# Patient Record
Sex: FEMALE | Race: WHITE | Employment: FULL TIME | URBAN - METROPOLITAN AREA
[De-identification: names, ages, dates, MRNs, and addresses within clinical notes are randomized per-mention and may not be internally consistent; named-entity substitution may affect disease eponyms.]

---

## 2017-05-30 ENCOUNTER — OFFICE VISIT (OUTPATIENT)
Dept: ORTHOPEDIC SURGERY | Age: 47
End: 2017-05-30

## 2017-05-30 VITALS
DIASTOLIC BLOOD PRESSURE: 81 MMHG | HEIGHT: 66 IN | BODY MASS INDEX: 23.14 KG/M2 | HEART RATE: 60 BPM | WEIGHT: 143.96 LBS | SYSTOLIC BLOOD PRESSURE: 137 MMHG

## 2017-05-30 DIAGNOSIS — M79.672 BILATERAL FOOT PAIN: Primary | ICD-10-CM

## 2017-05-30 DIAGNOSIS — M20.21 HALLUX RIGIDUS OF BOTH FEET: ICD-10-CM

## 2017-05-30 DIAGNOSIS — M20.22 HALLUX RIGIDUS OF BOTH FEET: ICD-10-CM

## 2017-05-30 DIAGNOSIS — M79.671 BILATERAL FOOT PAIN: Primary | ICD-10-CM

## 2017-05-30 PROCEDURE — 99213 OFFICE O/P EST LOW 20 MIN: CPT | Performed by: ORTHOPAEDIC SURGERY

## 2017-05-30 PROCEDURE — 73630 X-RAY EXAM OF FOOT: CPT | Performed by: ORTHOPAEDIC SURGERY

## 2018-02-27 ENCOUNTER — OFFICE VISIT (OUTPATIENT)
Dept: DERMATOLOGY | Age: 48
End: 2018-02-27

## 2018-02-27 DIAGNOSIS — D22.9 MULTIPLE NEVI: Primary | ICD-10-CM

## 2018-02-27 DIAGNOSIS — K13.0 CHEILITIS: ICD-10-CM

## 2018-02-27 DIAGNOSIS — D48.5 NEOPLASM OF UNCERTAIN BEHAVIOR OF SKIN: ICD-10-CM

## 2018-02-27 DIAGNOSIS — Z87.2 HISTORY OF ACTINIC KERATOSES: ICD-10-CM

## 2018-02-27 DIAGNOSIS — Z80.8 FAMILY HISTORY OF MALIGNANT MELANOMA: ICD-10-CM

## 2018-02-27 PROCEDURE — 99214 OFFICE O/P EST MOD 30 MIN: CPT | Performed by: DERMATOLOGY

## 2018-02-27 PROCEDURE — 11100 PR BIOPSY OF SKIN LESION: CPT | Performed by: DERMATOLOGY

## 2018-02-27 NOTE — PROGRESS NOTES
abnormal: RUE, Lip  trunk and extremities with scattered brown macules and papules   L FA with shiny bright pink macule  R upper lip with rough ill-defined scaly skin on the dry mucosa    Assessment and Plan     1. Benign-appearing nevi   2. History of AK's, none today  3. Family history of melanoma  - educ re ABCD's of MM   educ sun protection   encouraged skin check yearly (sooner if indicated), self checks    4. R FA - r/o BCC  - Shave biopsy performed after verbal consent obtained. Patient educated regarding risk of bleeding, infection, scar and educated on wound care. Skin cleansed with alcohol pad and site anesthetized with lido + epi. Aluminum chloride applied to site for hemostasis. Petrolatum ointment and bandage applied. Specimen bottle labeled with patient information and site and specimen sent to dermpath.       5. Cheilitis - concerning for AK/actinic damage  -  aquaphor or HC 1% - 2.5 ointment daily - bid and if no improvement in the next 2 mos, then consider efudex or LN2

## 2018-03-07 ENCOUNTER — TELEPHONE (OUTPATIENT)
Dept: DERMATOLOGY | Age: 48
End: 2018-03-07

## 2018-03-13 ENCOUNTER — PROCEDURE VISIT (OUTPATIENT)
Dept: DERMATOLOGY | Age: 48
End: 2018-03-13

## 2018-03-13 VITALS — SYSTOLIC BLOOD PRESSURE: 102 MMHG | DIASTOLIC BLOOD PRESSURE: 66 MMHG

## 2018-03-13 DIAGNOSIS — C44.612 BASAL CELL CARCINOMA OF RIGHT FOREARM: Primary | ICD-10-CM

## 2018-03-13 DIAGNOSIS — D48.5 NEOPLASM OF UNCERTAIN BEHAVIOR OF SKIN: ICD-10-CM

## 2018-03-13 PROCEDURE — 12032 INTMD RPR S/A/T/EXT 2.6-7.5: CPT | Performed by: DERMATOLOGY

## 2018-03-13 PROCEDURE — 11100 PR BIOPSY OF SKIN LESION: CPT | Performed by: DERMATOLOGY

## 2018-03-13 PROCEDURE — 11602 EXC TR-EXT MAL+MARG 1.1-2 CM: CPT | Performed by: DERMATOLOGY

## 2018-03-13 NOTE — PATIENT INSTRUCTIONS
Surgical Wound Care Instructions    Sutured Wounds:   After your surgery, go home and take it easy. Please refrain from any vigorous physical activity or heavy lifting.  Leave the pressure bandage in place for ~48 hours. If it starts to detach, reinforce the bandage with another piece of tape.  Please keep the bandage dry for ~48 hours.  After ~48 hours, the wound can get wet. Clean the area daily using mild soapy water and a gauze pad or cotton tipped applicator, applying gentle pressure.  DO NOT Apply aquaphor, Vaseline or petroleum jelly. DO NOT APPLY lotion, ointment, cream or oil to areas with surgical glue.  Cut a Telfa pad in the shape of the wound but slightly larger and secure with tape. Special Sites:   Facial sites:  o Keep the wound elevated for the first 2 nights.  o Do not sleep on the side of the body where the wound is located. o Do not bend your head lower than your heart or engage in heavy lifting.  Leg:  o Keep the leg elevated when reclining, using a pillow to elevate the extremity. Complications:   If bleeding develops when you go home, apply pressure for 15 minutes continuously. A small amount of ice in a bag covered with a towel may be used for another 10 minutes if necessary. If the bleeding does not stop, please call your dermatologist.   Please call the office if you develop any fevers, chills or pus drains from the wound.  A small amount of redness at the site of the surgery is normal at first, but if the redness begins to spread and/or pain worsens, you may have an infection and need to call the office.

## 2018-03-26 ENCOUNTER — TELEPHONE (OUTPATIENT)
Dept: DERMATOLOGY | Age: 48
End: 2018-03-26

## 2018-06-21 ENCOUNTER — HOSPITAL ENCOUNTER (OUTPATIENT)
Dept: ENDOSCOPY | Age: 48
Discharge: HOME | End: 2018-06-21
Attending: INTERNAL MEDICINE | Admitting: INTERNAL MEDICINE

## 2018-06-21 VITALS
HEIGHT: 66 IN | HEART RATE: 70 BPM | TEMPERATURE: 98.3 F | RESPIRATION RATE: 18 BRPM | DIASTOLIC BLOOD PRESSURE: 57 MMHG | WEIGHT: 135 LBS | BODY MASS INDEX: 21.69 KG/M2 | SYSTOLIC BLOOD PRESSURE: 137 MMHG | OXYGEN SATURATION: 99 %

## 2018-06-21 RX ORDER — OMEPRAZOLE 40 MG/1
40 CAPSULE, DELAYED RELEASE ORAL DAILY
Qty: 30 CAPSULE | Refills: 1 | Status: SHIPPED | OUTPATIENT
Start: 2018-06-21

## 2018-06-25 ENCOUNTER — TELEPHONE (OUTPATIENT)
Dept: DERMATOLOGY | Age: 48
End: 2018-06-25

## 2018-11-20 ENCOUNTER — OFFICE VISIT (OUTPATIENT)
Dept: ORTHOPEDIC SURGERY | Age: 48
End: 2018-11-20
Payer: COMMERCIAL

## 2018-11-20 VITALS — BODY MASS INDEX: 21.68 KG/M2 | HEIGHT: 66 IN | WEIGHT: 134.92 LBS

## 2018-11-20 DIAGNOSIS — M20.21 HALLUX RIGIDUS OF BOTH FEET: Primary | ICD-10-CM

## 2018-11-20 DIAGNOSIS — M20.22 HALLUX RIGIDUS OF BOTH FEET: Primary | ICD-10-CM

## 2018-11-20 PROCEDURE — 99212 OFFICE O/P EST SF 10 MIN: CPT | Performed by: ORTHOPAEDIC SURGERY

## 2019-02-11 ENCOUNTER — OFFICE VISIT (OUTPATIENT)
Dept: DERMATOLOGY | Age: 49
End: 2019-02-11
Payer: COMMERCIAL

## 2019-02-11 DIAGNOSIS — D22.9 MULTIPLE NEVI: Primary | ICD-10-CM

## 2019-02-11 DIAGNOSIS — Z87.2 HISTORY OF ACTINIC KERATOSES: ICD-10-CM

## 2019-02-11 DIAGNOSIS — Z80.8 FAMILY HISTORY OF MALIGNANT MELANOMA: ICD-10-CM

## 2019-02-11 DIAGNOSIS — Z85.828 HISTORY OF NONMELANOMA SKIN CANCER: ICD-10-CM

## 2019-02-11 PROCEDURE — 99213 OFFICE O/P EST LOW 20 MIN: CPT | Performed by: DERMATOLOGY

## 2019-02-11 RX ORDER — VALACYCLOVIR HYDROCHLORIDE 1 G/1
TABLET, FILM COATED ORAL
Qty: 16 TABLET | Refills: 2 | Status: SHIPPED | OUTPATIENT
Start: 2019-02-11 | End: 2020-08-31 | Stop reason: SDUPTHER

## 2019-09-30 ENCOUNTER — OFFICE VISIT (OUTPATIENT)
Dept: DERMATOLOGY | Age: 49
End: 2019-09-30
Payer: COMMERCIAL

## 2019-09-30 DIAGNOSIS — D48.5 NEOPLASM OF UNCERTAIN BEHAVIOR OF SKIN: ICD-10-CM

## 2019-09-30 DIAGNOSIS — D22.9 MULTIPLE NEVI: Primary | ICD-10-CM

## 2019-09-30 DIAGNOSIS — L57.0 AK (ACTINIC KERATOSIS): ICD-10-CM

## 2019-09-30 DIAGNOSIS — Z85.828 HISTORY OF NONMELANOMA SKIN CANCER: ICD-10-CM

## 2019-09-30 DIAGNOSIS — Z80.8 FAMILY HISTORY OF MALIGNANT MELANOMA: ICD-10-CM

## 2019-09-30 PROCEDURE — 11102 TANGNTL BX SKIN SINGLE LES: CPT | Performed by: DERMATOLOGY

## 2019-09-30 PROCEDURE — 99213 OFFICE O/P EST LOW 20 MIN: CPT | Performed by: DERMATOLOGY

## 2019-09-30 PROCEDURE — 17000 DESTRUCT PREMALG LESION: CPT | Performed by: DERMATOLOGY

## 2019-09-30 NOTE — PROGRESS NOTES
[id number]  Enrico Downing  1970    51 y.o. female     Date of Visit: 9/30/2019    Last seen 2-2019  *dad diagnosed with lung cancer in 2018    Chief Complaint: moles, f/u AK's   Chief Complaint   Patient presents with    Skin Exam     spot on nose     History of Present Illness:    1. Here for skin check for eval of multiple asx moles on the trunk and extremities, many present since child/young adult; no change in s/s/c of any lesions; no bleeding lesions; all asx   2. She has a hx of AK's - 1 new lesion on the L upper arm. Asx.  3. She has a family hx of melanoma. 4. Hx of NMSC - BCC - R FA - excision 3-2018. No probs with this site and no new concerns noted. Here for FSE. 5. She notes a small dry spot on the nose x 1 year but not healing for the past 2 weeks and has been bleeding. Asx. Hx of AK vs cheilitis on the upper lip - remains clear; rec aquaphor/HC at past visit. Family history of both BCC (father and sister) and MM (mother). Review of Systems:  Gen: Feels well, good sense of health. Skin: No new or changing moles. No new rashes. Past Medical History, Family History, Surgical History, Medications and Allergies reviewed. Social History:  Has 3 sons - they go to [address] [place] [address]. Chantelle Knows Dr. George Knapp and [facility]. Past Medical History:   Diagnosis Date    Arthritis     TOES GREAT    Cancer ([facility] [address]     basal cell cancer       Past Surgical History:   Procedure Laterality Date    CARPAL TUNNEL RELEASE Bilateral 11/2016    SKIN CANCER EXCISION      TONSILLECTOMY         Outpatient Medications Marked as Taking for the 9/30/19 encounter (Office Visit) with Mallorie Duncan MD   Medication Sig Dispense Refill    valACYclovir (VALTREX) 1 g tablet Take 2 po at first signs of flare and then 2 po 12 hours later.  16 tablet 2    omeprazole (PRILOSEC) 40 MG delayed release capsule Take 1 capsule by mouth daily 30 capsule 1   

## 2019-10-03 LAB — DERMATOLOGY PATHOLOGY REPORT: ABNORMAL

## 2019-10-09 DIAGNOSIS — C44.311 BASAL CELL CARCINOMA (BCC) OF SKIN OF NOSE: Primary | ICD-10-CM

## 2019-11-07 ENCOUNTER — PROCEDURE VISIT (OUTPATIENT)
Dept: SURGERY | Age: 49
End: 2019-11-07
Payer: COMMERCIAL

## 2019-11-07 VITALS
BODY MASS INDEX: 23.41 KG/M2 | TEMPERATURE: 98 F | WEIGHT: 145 LBS | OXYGEN SATURATION: 100 % | HEART RATE: 58 BPM | SYSTOLIC BLOOD PRESSURE: 132 MMHG | DIASTOLIC BLOOD PRESSURE: 73 MMHG

## 2019-11-07 DIAGNOSIS — D48.5 NEOPLASM OF UNCERTAIN BEHAVIOR OF SKIN: ICD-10-CM

## 2019-11-07 DIAGNOSIS — C44.311 BASAL CELL CARCINOMA (BCC) OF RIGHT NASAL SIDEWALL: Primary | ICD-10-CM

## 2019-11-07 PROCEDURE — 17312 MOHS ADDL STAGE: CPT | Performed by: DERMATOLOGY

## 2019-11-07 PROCEDURE — 17311 MOHS 1 STAGE H/N/HF/G: CPT | Performed by: DERMATOLOGY

## 2019-11-07 PROCEDURE — 12051 INTMD RPR FACE/MM 2.5 CM/<: CPT | Performed by: DERMATOLOGY

## 2019-11-07 PROCEDURE — 11102 TANGNTL BX SKIN SINGLE LES: CPT | Performed by: DERMATOLOGY

## 2019-11-08 ENCOUNTER — TELEPHONE (OUTPATIENT)
Dept: SURGERY | Age: 49
End: 2019-11-08

## 2019-11-12 ENCOUNTER — TELEPHONE (OUTPATIENT)
Dept: SURGERY | Age: 49
End: 2019-11-12

## 2019-11-14 ENCOUNTER — TELEPHONE (OUTPATIENT)
Dept: SURGERY | Age: 49
End: 2019-11-14

## 2020-01-09 ENCOUNTER — PROCEDURE VISIT (OUTPATIENT)
Dept: SURGERY | Age: 50
End: 2020-01-09
Payer: COMMERCIAL

## 2020-01-09 VITALS
SYSTOLIC BLOOD PRESSURE: 113 MMHG | OXYGEN SATURATION: 100 % | HEART RATE: 59 BPM | BODY MASS INDEX: 22.12 KG/M2 | DIASTOLIC BLOOD PRESSURE: 75 MMHG | TEMPERATURE: 98.1 F | WEIGHT: 137 LBS

## 2020-01-09 PROCEDURE — 17312 MOHS ADDL STAGE: CPT | Performed by: DERMATOLOGY

## 2020-01-09 PROCEDURE — 17311 MOHS 1 STAGE H/N/HF/G: CPT | Performed by: DERMATOLOGY

## 2020-01-09 PROCEDURE — 12052 INTMD RPR FACE/MM 2.6-5.0 CM: CPT | Performed by: DERMATOLOGY

## 2020-01-09 PROCEDURE — 11102 TANGNTL BX SKIN SINGLE LES: CPT | Performed by: DERMATOLOGY

## 2020-01-09 NOTE — PATIENT INSTRUCTIONS
[facility] Office Hours:    Monday-Thursday  7:30 AM-4:30 PM    Friday  9:00 AM-3:00 PM    POST-OPERATIVE CARE FOR LIQUID SKIN ADHESIVE             Bandage change after 24 hours    During your procedure today, a liquid skin adhesive was used to close the wound. You do not have to have stiches removed. If has a clear to light purple shiny surface. You do not have to have this removed. It will dissolve (melt away) in about 1-2 weeks. Please follow these instructions to help you recover from your procedure and help you wound heal.    CARING FOR YOUR SURGICAL SITE  The bandage should remain on and completley dry for 24 hours. Do NOT get the bandage wet. 1. After the first 24 hours, gently remove the remaining part of the bandage. It can be helpful to moisten the bandage edges in the shower. 2. Be gentle around the area of the wound. Do not scrub, rub or pick at the skin glue. It will gradually dissolve in 1-2 weeks. 3. Do not shave directly over the wound for one week. You can shave around the area. 4. After one week you can start cleaning the area gently and resume all normal activity. No further restrictions. 5. Use Sunscreen with SPF of at least 30 on the area around the wound. If the dressing comes off or if you have questions, or concerns about the dressing, please call the office for instructions! POST OPERATIVE INSTRUCTIONS    1. Activity: Do not lift anything heavier than a gallon of milk for 1 week. Also, avoid strenuous activity such as running, power walking or contact sports. 2. Eating and drinking: Do not drink alcohol for 48 hours after your procedure. Alcohol increases the chances of bleeding. 3. Medicines   -If you have discomfort, take Acetaminophen (Tylenol or Extra Strength Tylenol). Follow the instructions and warning on the bottle. -If your doctor has prescribed you an Aspirin daily, please keep taking it.  Do not take extra Aspirin or medicines containing

## 2020-01-09 NOTE — PROGRESS NOTES
skin, using the technique of Mohs. A map was prepared to correspond to the area of skin from which it was excised. Hemostasis was achieved using electrosurgery. The wound was bandaged. The tissue was prepared for the cryostat and sectioned. 1 section(s) prepared. Each section was coded, cut, and stained for microscopic examination. The entire base and margins of the excised piece of tissue were examined by the surgeon. Stage I, II:  Superficial BCC: isolated basaloid lobules projecting from the lower margin of the epidermis. The remaining tumor was noted and the next stage was performed. Stage II, III:  A thin layer of tissue was removed at the histologically-identified sites of remaining tumor. The entire procedure as described in stage I was repeated to process the tissue according to Mohs technique. I section(s) prepared for stage II and III. No tumor was identified at the peripheral margins of stage III of microscopically controlled surgery. DEFECT MANAGEMENT:    REPAIR DESCRIPTION:  Various closure modalities were discussed with the patient, and it was decided that an intermediate layered repair would best preserve normal anatomic and functional relationships. Additional risk of wound dehiscence was discussed. The area was anesthetized with 1% lidocaine with epinephrine 1:100,000 buffered, was given a sterile prep using Betadine and draped in the usual sterile fashion. Recreation and enlargement of the wound was performed by excising cones of tissue via the triangulation technique. The final incision lines were placed with respect for the patient's natural skin tension lines in a linear configuration to avoid functional and aesthetic distortion of adjacent free margins. Following minimal undermining, meticulous hemostasis was obtained with spot mother.   Subcutaneous dead space and dermis were closed using 5-0 Vicryl buried subcutaneous interrupted suture and the epidermis was approximated with 5-0 Fast absorbing gut running epidermal sutures . WOUND COVERAGE:  The wound was cleaned with normal saline solution, dried off, liquid skin adhesive was applied, and the wound was covered. A dressing was applied for stabilization and light pressure. The patient was given detailed oral and written instructions on postoperative care. There were no complications. The patient left the Unit in good medical condition. FOLLOW-UP:  As dissolving sutures were placed, the patient was asked to return if any questions or concerns arose, but otherwise will return to see general dermatology per their instructions.

## 2020-01-10 ENCOUNTER — TELEPHONE (OUTPATIENT)
Dept: SURGERY | Age: 50
End: 2020-01-10

## 2020-01-15 ENCOUNTER — TELEPHONE (OUTPATIENT)
Dept: SURGERY | Age: 50
End: 2020-01-15

## 2020-02-11 ENCOUNTER — OFFICE VISIT (OUTPATIENT)
Dept: DERMATOLOGY | Age: 50
End: 2020-02-11
Payer: COMMERCIAL

## 2020-02-11 PROCEDURE — 17003 DESTRUCT PREMALG LES 2-14: CPT | Performed by: DERMATOLOGY

## 2020-02-11 PROCEDURE — 17000 DESTRUCT PREMALG LESION: CPT | Performed by: DERMATOLOGY

## 2020-02-11 PROCEDURE — 99213 OFFICE O/P EST LOW 20 MIN: CPT | Performed by: DERMATOLOGY

## 2020-02-11 NOTE — PROGRESS NOTES
[facility]  Mohsen Jackson MD  [phone]      Amanda Rocha  1970    47 y.o. female     Date of Visit: 2/11/2020    Last seen 9-2019  *dad diagnosed with lung cancer in 2018    Chief Complaint: moles, f/u AK's   Chief Complaint   Patient presents with    Skin Lesion     FSE     - mole check      HX: multi nevi     History of Present Illness:    1. Here for skin check for eval of multiple asx moles on the trunk and extremities, many present since child/young adult; no change in s/s/c of any lesions; no bleeding lesions; all asx   2. She has a hx of AK's - few new on the face;  Asx.   3. She has a family hx of melanoma. 4. Hx of NMSC - sp Mohs for 2 new BCC\"s on the nose since last seen with erythematous healing scars:  Nod BCC on the R nasal sidewall - s/p Mohs  with Dr. Vlad Macdonald on the R nasal bridge - s/p Mohs 1-2020  BCC - R FA - excision 3-2018. No probs with this sites and no new concerns noted. Here for FSE. Hx of AK vs cheilitis on the upper lip - remains clear; rec aquaphor/HC at past visit. Family history of both BCC (father and sister) and MM (mother). Review of Systems:  Gen: Feels well, good sense of health. Skin: No new or changing moles. No new rashes. Past Medical History, Family History, Surgical History, Medications and Allergies reviewed. Social History:  Has 3 sons - they go to [place]. Chantelle Hernandez and Clarice Hooks.     Past Medical History:   Diagnosis Date    Arthritis     TOES GREAT    Cancer ([address]     basal cell cancer       Past Surgical History:   Procedure Laterality Date    CARPAL TUNNEL RELEASE Bilateral 11/2016    MOHS SURGERY  11/07/2019    MOHS SURGERY Right 01/09/2020    BCC superficial right nasal bridge    SKIN CANCER EXCISION      TONSILLECTOMY         Outpatient Medications Marked as Taking for the 2/11/20 encounter (Office Visit) with Marvin Hubbard MD   Medication Sig Dispense Refill   [place] valACYclovir (VALTREX) 1 g tablet Take 2 po at first signs of flare and then 2 po 12 hours later. 16 tablet 2    omeprazole (PRILOSEC) 40 MG delayed release capsule Take 1 capsule by mouth daily 30 capsule 1    Omega-3 Fatty Acids (FISH OIL) 1000 MG CAPS Take 3,000 mg by mouth daily      TURMERIC CURCUMIN PO Take by mouth daily      Multiple Vitamin (MULTIVITAMINS PO) Take  by mouth.  vitamin D (CHOLECALCIFEROL) 1000 UNIT TABS tablet Take 2,000 Units by mouth daily          Allergies   Allergen Reactions    No Known Allergies          Physical Examination     Well-appearing  The following were examined and determined to be normal: Psych/Neuro, Scalp/hair, Conjunctivae/eyelids,, Neck, Breast/axilla/chest, Abdomen, Back, RLE, LLE, Nails/digits, Oral and buttocks. RUE, Lip  The following were examined and determined to be abnormal: face, LUE    trunk and extremities with scattered brown macules and papules   R FA with linear faintly pink scar  R nasal sidewall with linear mildly erythematous and telangiectatic scar  R nasal bridge with firm erythemaotus telangiectatic scar  L cheek and L FH with erythematous roughly scaled macules            Assessment and Plan     1. Benign-appearing nevi   - educ re ABCD's of MM   educ sun protection   encouraged skin check yearly (sooner if indicated), self checks     2. History of AK's, 2 new today on the L cheek and L FH  - 2 lesion(s) on the L arm treated with liquid nitrogen x 2 cycles. Patient educated on risk of blister, hypopigmentation/scar and wound care. Consider PDT if needed in the fall/winter. 3.  Family history of melanoma  4.  Hx of BCC, no signs recurrence  - educ re ABCD's of MM   educ sun protection   encouraged skin check yearly (sooner if indicated), self checks    Telangiectatic erythematous scars  - R nasal sidewall/dorsum and R nasal bridge  *Vbeam today for scars - see below; no extrac charge    Laser Procedure Note       Kamilah Night   DATE

## 2020-08-31 ENCOUNTER — OFFICE VISIT (OUTPATIENT)
Dept: DERMATOLOGY | Age: 50
End: 2020-08-31
Payer: COMMERCIAL

## 2020-08-31 VITALS — TEMPERATURE: 98.8 F

## 2020-08-31 PROCEDURE — 11103 TANGNTL BX SKIN EA SEP/ADDL: CPT | Performed by: DERMATOLOGY

## 2020-08-31 PROCEDURE — 99213 OFFICE O/P EST LOW 20 MIN: CPT | Performed by: DERMATOLOGY

## 2020-08-31 PROCEDURE — 17000 DESTRUCT PREMALG LESION: CPT | Performed by: DERMATOLOGY

## 2020-08-31 PROCEDURE — 11102 TANGNTL BX SKIN SINGLE LES: CPT | Performed by: DERMATOLOGY

## 2020-08-31 RX ORDER — VALACYCLOVIR HYDROCHLORIDE 1 G/1
TABLET, FILM COATED ORAL
Qty: 16 TABLET | Refills: 2 | Status: SHIPPED | OUTPATIENT
Start: 2020-08-31 | End: 2020-11-29 | Stop reason: SDUPTHER

## 2020-08-31 NOTE — PROGRESS NOTES
[facility]  Meriel Shone, MD  [phone]      Shilpa Patino  1970    47 y.o. female     Date of Visit: 8/31/2020    Last seen 2-2020  *dad diagnosed with lung cancer in 2018    Chief Complaint: moles, f/u AK's   Chief Complaint   Patient presents with    Skin Exam     History of Present Illness:    1. Here for skin check for eval of multiple asx moles on the trunk and extremities, many present since child/young adult; no change in s/s/c of any lesions; no bleeding lesions; all asx     2. She has a hx of AK's - 1 new on the FH;  Asx.     3. She has a family hx of melanoma. 4. Hx of NMSC - several in recent years. Nod BCC on the R nasal sidewall - s/p Mohs  with Dr. Katie Urbina  Sup [address] on the R nasal bridge - s/p Mohs 1-2020  BCC - R FA - excision 3-2018. No probs with this sites but new concerns noted below. Here for FSE. 5. She has a few noted concerning lesions:  R outer shoulder - pigmented lesion  R upper outer arm - pink spot  L anterior ala - small pink persistent bump  All asx. Hx of AK vs cheilitis on the upper lip - remains clear; rec aquaphor/HC at past visit. Family history of both BCC (father and sister) and MM (mother). Review of Systems:  Gen: Feels well, good sense of health. Skin: No new or changing moles. No new rashes. Past Medical History, Family History, Surgical History, Medications and Allergies reviewed. Social History:  Has 3 sons - they go to [address] [place] [address]. Chantelle Fernandez and Jennifer Christensen.     Past Medical History:   Diagnosis Date    Arthritis     TOES GREAT    Cancer ([address]     basal cell cancer       Past Surgical History:   Procedure Laterality Date    CARPAL TUNNEL RELEASE Bilateral 11/2016    MOHS SURGERY  11/07/2019    MOHS SURGERY Right 01/09/2020    BCC superficial right nasal bridge    SKIN CANCER EXCISION      TONSILLECTOMY         Outpatient Medications Marked as Taking for the 8/31/20 encounter (Office Visit) with Maria Isabel Archer MD   Medication Sig Dispense Refill    valACYclovir (VALTREX) 1 g tablet Take 2 po at first signs of flare and then 2 po 12 hours later. 16 tablet 2    omeprazole (PRILOSEC) 40 MG delayed release capsule Take 1 capsule by mouth daily 30 capsule 1    Omega-3 Fatty Acids (FISH OIL) 1000 MG CAPS Take 3,000 mg by mouth daily      TURMERIC CURCUMIN PO Take by mouth daily      Multiple Vitamin (MULTIVITAMINS PO) Take  by mouth.  vitamin D (CHOLECALCIFEROL) 1000 UNIT TABS tablet Take 2,000 Units by mouth daily          Allergies   Allergen Reactions    No Known Allergies          Physical Examination     Well-appearing  The following were examined and determined to be normal: Psych/Neuro, Scalp/hair, Conjunctivae/eyelids,, Neck, Breast/axilla/chest, Abdomen, Back, RLE, LLE, Nails/digits, Oral and buttocks. RUE, Lip  The following were examined and determined to be abnormal: face, LUE    trunk and extremities with scattered brown macules and papules   R FA with linear faintly pink scar  R nasal sidewall with linear mildly erythematous and telangiectatic scar  R nasal bridge with firm erythemaotus telangiectatic scar  FH with erythematous roughly scaled macule     R outer shoulder - irregular brown macule/thin papule  R upper outer arm - pink eroded macule  L anterior ala - 2 mm pink papule                    Assessment and Plan     1. Benign-appearing nevi   - educ re ABCD's of MM   educ sun protection   encouraged skin check yearly (sooner if indicated), self checks     2. History of AK's, 1 new today on the FH  - 1 lesion(s) on the L arm treated with liquid nitrogen x 2 cycles. Patient educated on risk of blister, hypopigmentation/scar and wound care. Consider PDT if needed in the fall/winter. 3.  Family history of melanoma  4. Hx of BCC, no signs recurrence  - educ re ABCD's of MM   educ sun protection   encouraged skin check yearly (sooner if indicated), self checks    5.  R outer shoulder - r/o dysplastic nevus - site A  R upper outer arm - r/o BCC - site B  L anterior ala - r/o BCC  - 3 Shave biopsy performed after verbal consent obtained. Patient educated regarding risk of bleeding, infection, scar and educated on wound care. Skin cleansed with alcohol pad and site anesthetized with lido + epi. Aluminum chloride applied to site for hemostasis. Petrolatum ointment and bandage applied. Specimen bottle labeled with patient information and site and specimen sent to dermpath. Consider further Vbeam  Telangiectatic erythematous scars  - R nasal sidewall/dorsum and R nasal bridge    F/u in 4-6 mos.

## 2020-09-02 LAB — DERMATOLOGY PATHOLOGY REPORT: ABNORMAL

## 2020-09-09 ENCOUNTER — TELEPHONE (OUTPATIENT)
Dept: DERMATOLOGY | Age: 50
End: 2020-09-09

## 2020-09-11 ENCOUNTER — TELEPHONE (OUTPATIENT)
Dept: DERMATOLOGY | Age: 50
End: 2020-09-11

## 2020-09-15 ENCOUNTER — TELEPHONE (OUTPATIENT)
Dept: DERMATOLOGY | Age: 50
End: 2020-09-15

## 2020-09-15 NOTE — TELEPHONE ENCOUNTER
Patient is requesting a return call from Dr Loretta Vanegas to discuss alternate treatments for third basal cell on nose. What will be the next best step. Please advise. Thank you!

## 2020-10-12 ENCOUNTER — PROCEDURE VISIT (OUTPATIENT)
Dept: DERMATOLOGY | Age: 50
End: 2020-10-12
Payer: COMMERCIAL

## 2020-10-12 VITALS — TEMPERATURE: 98.2 F

## 2020-10-12 PROCEDURE — 99213 OFFICE O/P EST LOW 20 MIN: CPT | Performed by: DERMATOLOGY

## 2020-10-12 PROCEDURE — 11401 EXC TR-EXT B9+MARG 0.6-1 CM: CPT | Performed by: DERMATOLOGY

## 2020-10-12 RX ORDER — FLUOROURACIL 50 MG/G
CREAM TOPICAL
Qty: 40 G | Refills: 3 | Status: SHIPPED | OUTPATIENT
Start: 2020-10-12 | End: 2020-11-19 | Stop reason: SDUPTHER

## 2020-10-12 NOTE — PATIENT INSTRUCTIONS
Wound Care Instructions    · Keep the bandage in place for 24 hours. · Cleanse the wound with mild soapy water daily   Gently dry the area.  Apply Vaseline or petroleum jelly to the wound using a cotton tipped applicator.  Cover with a clean bandage.  Repeat this process until the biopsy site is healed.  If you had stitches placed, continue treating the site until the stitches are removed. Remember to make an appointment to return to have your stitches removed by our staff.  You may shower and bathe as usual.     *SUTURES should stay in~ 14 days    ** results generally take around 7 business days to come back. If you have not heard from us by then, please call the office at [phone] between 8AM and 4PM Monday through Friday.

## 2020-10-12 NOTE — PROGRESS NOTES
[facility]  Natasha Lane MD  [phone]      Ana Loser  1970    47 y.o. female     Date of Visit: 10/12/2020    Last seen 8-2020  *dad diagnosed with lung cancer in 2018    Chief Complaint: dysplastic nevus, hx of NMSC  Chief Complaint   Patient presents with    Nevus     dysplastic on right shoulder     History of Present Illness:    1. Here for excision fo moderately dysplastic nevus on the R shoulder, bx'd 8-2020 and no probs since. Extended to lateral and deep margin on the bx.    2. BCC on the nose - bx'd 8-2020 - has Mohs appt with Dr. Pamella Barboza next month. No probs since. 3. She has a hx of AK's - would like to consider more \"preventative\" trx for the face, chest, arns, nellie with hx of multiple BCC's in the past 2 years. She has a family hx of melanoma. Nod BCC on the R nasal sidewall - s/p Mohs  with Dr. Reynaldo Emmanuel  Sup [address] on the R nasal bridge - s/p Mohs 1-2020  BCC - R FA - excision 3-2018. Hx of AK vs cheilitis on the upper lip - remains clear; rec aquaphor/HC at past visit. Family history of both BCC (father and sister) and MM (mother). Review of Systems:  Gen: Feels well, good sense of health. Skin: No new or changing moles. No new rashes. Past Medical History, Family History, Surgical History, Medications and Allergies reviewed. Social History:  Has 3 sons - they go to [facility] [place]. Chantelle Whitney and Shannen Montelongo.     Past Medical History:   Diagnosis Date    Arthritis     TOES GREAT    Cancer ([facility] [address]     basal cell cancer       Past Surgical History:   Procedure Laterality Date    CARPAL TUNNEL RELEASE Bilateral 11/2016    MOHS SURGERY  11/07/2019    MOHS SURGERY Right 01/09/2020    BCC superficial right nasal bridge    SKIN CANCER EXCISION      TONSILLECTOMY         Outpatient Medications Marked as Taking for the 10/12/20 encounter (Procedure visit) with Loralie Seip, MD   Medication Sig Dispense Refill    valACYclovir (VALTREX) 1 g tablet Take 2 po at first signs of flare and then 2 po 12 hours later. 16 tablet 2    omeprazole (PRILOSEC) 40 MG delayed release capsule Take 1 capsule by mouth daily 30 capsule 1    Omega-3 Fatty Acids (FISH OIL) 1000 MG CAPS Take 3,000 mg by mouth daily      TURMERIC CURCUMIN PO Take by mouth daily      Multiple Vitamin (MULTIVITAMINS PO) Take  by mouth.  vitamin D (CHOLECALCIFEROL) 1000 UNIT TABS tablet Take 2,000 Units by mouth daily          Allergies   Allergen Reactions    No Known Allergies          Physical Examination     Well-appearing    The following were examined and determined to be normal: Psych/Neuro, Head/face, Conjunctivae/eyelids, Gums/teeth/lips, Neck, Breast/axilla/chest and LUE. The following were examined and determined to be abnormal: RUE. R outer shoulder -pink smooth scar  L anterior ala - clear                    Assessment and Plan     1. R outer shoulder - site A -mod dysplastic and not excised - extending to the lateral margin and base  - excision today after risks reviewed, options discussed  educ risk bleed, infxn, scar   area anesth with lido with epi and prepped in sterile manner   Lesion excised to superficial SQ with 8 mm punch tool with margins   specimen to path    wound closed with simple 4-0 running nylon suture   pressure dressing applied   pt educ re wnd care and suture removal      2. L anterior ala - BCC - Mohs with Dr. Marion Corea next month    3.  Hx of AK's and NMSC  - discussed sun protection, PDT, efudex, aldara, tretinoin  - cont sun protection  - plan for PDT for the face (can avoid immediate perioral area but still take valtrex prophylactically) - incubate 1 hour + 15 min  - can try efudex for the chest, arms  - discussed typical response/goals, sun avoidance with above  - add tretinoin 0.025% cr qhs to face; educ irrit and photosens  - can add nicotinamide 500 mg po bid     Consider further Vbeam for scar  Telangiectatic erythematous scars  - R nasal sidewall/dorsum and R nasal bridge    F/u in 4-6 mos.

## 2020-10-13 ENCOUNTER — TELEPHONE (OUTPATIENT)
Dept: DERMATOLOGY | Age: 50
End: 2020-10-13

## 2020-10-13 NOTE — TELEPHONE ENCOUNTER
----- Message from Darcie Cronin MD sent at 10/12/2020 10:58 PM EDT -----  Please let her know that I also meant to tell her she can try a vitamin B3 supplement - nicotinamide 500 mg po bid. There is some evidence that supplementing this can reduce future skin cancer risk. Don't take straight niacin though b/c it can cause flushing.

## 2020-10-15 LAB — DERMATOLOGY PATHOLOGY REPORT: NORMAL

## 2020-11-03 ENCOUNTER — OFFICE VISIT (OUTPATIENT)
Dept: PRIMARY CARE CLINIC | Age: 50
End: 2020-11-03
Payer: COMMERCIAL

## 2020-11-03 PROCEDURE — 99211 OFF/OP EST MAY X REQ PHY/QHP: CPT | Performed by: NURSE PRACTITIONER

## 2020-11-04 LAB — SARS-COV-2, NAA: NOT DETECTED

## 2020-11-05 ENCOUNTER — TELEPHONE (OUTPATIENT)
Dept: DERMATOLOGY | Age: 50
End: 2020-11-05

## 2020-11-05 NOTE — RESULT ENCOUNTER NOTE

## 2020-11-06 ENCOUNTER — TELEPHONE (OUTPATIENT)
Dept: SURGERY | Age: 50
End: 2020-11-06

## 2020-11-09 ENCOUNTER — PROCEDURE VISIT (OUTPATIENT)
Dept: SURGERY | Age: 50
End: 2020-11-09
Payer: COMMERCIAL

## 2020-11-09 VITALS — DIASTOLIC BLOOD PRESSURE: 76 MMHG | SYSTOLIC BLOOD PRESSURE: 128 MMHG | TEMPERATURE: 97.2 F

## 2020-11-09 PROCEDURE — 17311 MOHS 1 STAGE H/N/HF/G: CPT | Performed by: DERMATOLOGY

## 2020-11-09 NOTE — PROGRESS NOTES
MOHS PROCEDURE NOTE    PHYSICIAN:  Ever Resendez. Will Addison MD    ASSISTANT: Bernie Pittman RN, Pricila Hunt LPN     REFERRING PROVIDER:  Manish Lee MD    PREOPERATIVE DIAGNOSIS: Nodular Basal Cell Carcinoma     SPECIFIC MOHS INDICATIONS:  location    AUC SCORIN/9    POSTOPERATIVE DIAGNOSIS: SAME    LOCATION: Left anterior ala    OPERATIVE PROCEDURE:  MOHS MICROGRAPHIC SURGERY    RECONSTRUCTION OF DEFECT: Second Intention Wound Healing    PREOPERATIVE SIZE: 3x3 MM    DEFECT SIZE: 4x3 MM    LENGTH OF REPAIRED WOUND/SIZE OF FLAP/SIZE OF GRAFT:  N/A MM    ANESTHESIA:  1mL 1% lidocaine with epinephrine 1:100,000 buffered. EBL:  MINIMAL    DURATION OF PROCEDURE:  30 MINUTES    POSTOPERATIVE OBSERVATION: 30 MINUTES    SPECIMENS:  SEE MOHS MAP    COMPLICATIONS:  NONE    DESCRIPTION OF PROCEDURE:  The patient was given a mirror, as appropriate, and the biopsy site was identified, marked with a surgical marking pen, and verified by the patient. Options for treatment were discussed and the patient was informed that Mohs surgery was the selected treatment based on its lower recurrence rate, given the features listed above, as compared to other treatment modalities such as excision, radiation, or curettage, and agreed with this treatment plan. Risks and benefits including bruising, swelling, bleeding, infection, nerve injury, recurrence, and scarring were discussed with the patient prior to the procedure and a written consent detailing these and other risks was reviewed with the patient and signed. There was a time out for person and procedure verification. The surgical site was prepped with an antiseptic solution. Application of an antiseptic solution was repeated before each surgical stage. Stage I:  The clinically-apparent tumor was carefully defined and debulked, determining the edge of the surgical excision.     A thin layer of tumor-laden tissue was excised with a narrow margin of normal-appearing

## 2020-11-09 NOTE — PATIENT INSTRUCTIONS
[facility] Office Hours:    Monday-Thursday  7:30 AM-4:30 PM    Friday  9:00 AM-1:00 PM    Holiday Hours: Our staff would like to inform you of the changes of office hours during the holiday season. The following dates will differ from our regular office hours. 11/26/20 - 11/27/20 -  Thanksgiving. Office Closed. 12/24/20 - 12/25/20 - Christmas. Office Closed. 12/31/20 - 1/1/2021 - New Years. Office Closed. DAILY WOUND CARE-SECOND INTENTION    1.  Keep the area absolutely dry for 48 hours. -After 48 hours you may remove the bandage and shower. 2.  Remove the bandage and clean the wound with mild soap and water. Try to clean off crust and debris. -After one week, you may rub the surface of the wound fairly aggressively (a small amount of bleeding is normal when you do this). It is important not to allow a scab to form as scabs slow down the healing process. Dry the area with a clean Q-tip or gauze. 3.  Apply a layer of Vaseline (or Bacitracin if your doctor recommends) to the wound area  only. 4.  Cut a piece of Telfa (or any non-stick dressing) to fit just over the wound and secure it with paper tape. If the wound is small you may use a Band-Aid    You should continue daily wound care and keep a bandage on until new skin has grown over the entire wound    ? Bleeding: If bleeding occurs, DO NOT remove the bandage. Put firm pressure on the area with gauze for 20 minutes without peeking. If the bleeding continues, apply pressure for 20 minutes more. ? If the bleeding does not stop after you apply pressure, call us right away. If you cant call, go to the nearest emergency room or urgent care facility. POST-OPERATIVE INSTRUCTIONS     1. Activity: Do not lift anything heavier than a gallon of milk for 1 week. Also, avoid strenuous activity such as running, power walking or contact sports.   2.  Eating and drinking: Do not drink alcohol for 48 hours after your procedure. Alcohol increases the chances of bleeding. 3.  Medicines:  -If you have discomfort, take acetaminophen (Tylenol or Extra Strength Tylenol). Follow the instructions and warning on the bottle. -If your doctor has prescribed you an aspirin daily, please keep taking it. Do not take extra aspirin or medicines containing aspirin (such as Joanna[place] and Excedrin) for 48 hours after your procedure. 4.  Symptoms: You may have these symptoms. They are normal and should get better with time:  A. Swelling. Swelling usually increases for 24 to 48 hours after your procedure and then begins to improve. B.  Some soreness and redness around your wound. C.  Bruising which can last for up to 2 weeks    WHAT TO EXPECT FOR THE COMING WEEKS TO MONTHS  1. You may use make-up once the area is well healed. 2.  Vitamin E oil is NOT necessary. A good moisturizer is just as effective. 3.  Sunscreen IS necessary. Use at least an SPF 30 sunscreen daily- even in the winter.    -Scars take from 6 months to 1 year to fully mature. After the area has healed, it may be helpful to gently massage the area with a moisturizer, petroleum jelly (Vaseline) or Aquaphor. This helps to soften the scar tissue.   -The color of the scar will even out over time, but may remain pink for several months. Swelling may also remain for several months, especially if the area is on the legs.       Call us at [phone] right away if you have any of the following symptoms:   Bleeding that you cant stop (see above)   Pain that lasts longer than 48 hours   Your wound becomes more painful, red or hot   Bruising and swelling that does not improve within 48 hours or gets worse suddenly

## 2020-11-10 ENCOUNTER — TELEPHONE (OUTPATIENT)
Dept: SURGERY | Age: 50
End: 2020-11-10

## 2020-11-10 NOTE — TELEPHONE ENCOUNTER
Patient called back to reschedule her photodynamic treatment. Patient is using efudex on her chest and arms.     Please call   [phone]

## 2020-11-11 NOTE — TELEPHONE ENCOUNTER
Patient scheduled for PDT. Patient is going through a 40 gram tube before her 30 days are up and can get a refill. Patient wondering if she can have a printed script and fill through another pharmacy with Jaret?

## 2020-11-13 NOTE — TELEPHONE ENCOUNTER
Please let her know that yes, I'll print a new rx when I'm back in the office on Monday and we can either mail it to her or she can stop by to pick it up. Please send this back to me as a reminder to print the rx next week. Thanks!

## 2020-11-19 RX ORDER — FLUOROURACIL 50 MG/G
CREAM TOPICAL
Qty: 40 G | Refills: 5 | Status: SHIPPED | OUTPATIENT
Start: 2020-11-19 | End: 2021-03-23

## 2020-11-23 ENCOUNTER — TELEPHONE (OUTPATIENT)
Dept: DERMATOLOGY | Age: 50
End: 2020-11-23

## 2020-11-23 NOTE — TELEPHONE ENCOUNTER
Patient is requesting a return call re/ fluorouracil (EFUDEX) 5 % cream. Patient is asking if prescription can written every 10 days. If insurance will not cover, can a script be written to patient. Please advise. Thank you!

## 2020-11-24 ENCOUNTER — TELEPHONE (OUTPATIENT)
Dept: SURGERY | Age: 50
End: 2020-11-24

## 2020-11-24 NOTE — TELEPHONE ENCOUNTER
Followed up with patient 2 weeks post Mohs surgery on the L anterior ala with 2nd intention wound healing. Pt reports site healing well with no complaints. Requested that pt send photo of site to this nurse's email account for MD to review.  Will follow back up with pt and close encounter after photo is received and reviewed by MD.

## 2020-11-25 NOTE — TELEPHONE ENCOUNTER
Looks good as far healing - continue same protocol and next photo from a little further away to get perspective.

## 2020-11-25 NOTE — TELEPHONE ENCOUNTER
LVM with pt informing her that photo has been reviewed and she should continue same wound care regimen. Will follow up in about 2 weeks to check continued progress.

## 2020-11-29 RX ORDER — VALACYCLOVIR HYDROCHLORIDE 500 MG/1
TABLET, FILM COATED ORAL
Qty: 20 TABLET | Refills: 1 | Status: SHIPPED | OUTPATIENT
Start: 2020-11-29

## 2020-12-10 ENCOUNTER — TELEPHONE (OUTPATIENT)
Dept: SURGERY | Age: 50
End: 2020-12-10

## 2020-12-14 NOTE — TELEPHONE ENCOUNTER
Looks completely healed. Pt can discontinue any local wound care at this point. She should assess scar in about 3 months and call if she has any concerns about appearance.

## 2020-12-14 NOTE — TELEPHONE ENCOUNTER
Returned pt call to inform her that wound care can be discontinued. The patient was unable to be reached and a voice message was left. Made aware to call with any scar concerns over next few weeks/months.

## 2020-12-16 ENCOUNTER — NURSE ONLY (OUTPATIENT)
Dept: DERMATOLOGY | Age: 50
End: 2020-12-16
Payer: COMMERCIAL

## 2020-12-16 PROCEDURE — 96567 PDT DSTR PRMLG LES SKN: CPT | Performed by: DERMATOLOGY

## 2021-03-23 ENCOUNTER — PROCEDURE VISIT (OUTPATIENT)
Dept: DERMATOLOGY | Age: 51
End: 2021-03-23
Payer: COMMERCIAL

## 2021-03-23 VITALS — TEMPERATURE: 97 F

## 2021-03-23 DIAGNOSIS — D48.5 NEOPLASM OF UNCERTAIN BEHAVIOR OF SKIN: ICD-10-CM

## 2021-03-23 DIAGNOSIS — C44.519 BASAL CELL CARCINOMA (BCC) OF FLANK: ICD-10-CM

## 2021-03-23 DIAGNOSIS — Z85.828 HISTORY OF NONMELANOMA SKIN CANCER: Primary | ICD-10-CM

## 2021-03-23 DIAGNOSIS — D22.9 MULTIPLE NEVI: ICD-10-CM

## 2021-03-23 DIAGNOSIS — L81.4 LENTIGINES: ICD-10-CM

## 2021-03-23 DIAGNOSIS — L57.0 AK (ACTINIC KERATOSIS): ICD-10-CM

## 2021-03-23 DIAGNOSIS — Z86.018 HISTORY OF DYSPLASTIC NEVUS: ICD-10-CM

## 2021-03-23 PROCEDURE — 17261 DSTRJ MAL LES T/A/L .6-1.0CM: CPT | Performed by: DERMATOLOGY

## 2021-03-23 PROCEDURE — 99214 OFFICE O/P EST MOD 30 MIN: CPT | Performed by: DERMATOLOGY

## 2021-03-23 RX ORDER — CALCIPOTRIENE 50 UG/G
CREAM TOPICAL
Qty: 60 G | Refills: 1 | Status: SHIPPED | OUTPATIENT
Start: 2021-03-23

## 2021-03-23 NOTE — PROGRESS NOTES
[address] [facility]  Jhoana Layne MD  [phone]      Tonny Bowling  1970    45 y.o. female     Date of Visit: 3/23/2021    Last seen   *dad diagnosed with lung cancer in 2018  *has had a lot of stress lately - has had to have emergency guardianship over her mom in 3-2021    Chief Complaint: dysplastic nevus, hx of NMSC  Chief Complaint   Patient presents with    Skin Lesion     History of Present Illness:    1. F/u NMSC. No probs with previous sites. *BCC on the L ala - Mohs with Dr. Melony Trevizo   *Nod BCC on the R nasal sidewall - Mohs  with Dr. Shireen Rodriguez on the R nasal bridge - s/p Mohs 1-2020  *BCC - R FA - excision 3-2018. 2. Here for evaluation of multiple asx pigmented lesions on the trunk and extremities, present for many years; no change in size/shape/color of any lesions; no bleeding lesions. 3. Hx of dysplastic nevus/nevi - s/p excision of moderately dysplastic nevus on the R shoulder . No probs since. 4. F/u AK's  S/p PDT for the face  - tolerated well. S/p Efudex for the chest x 4 weeks since last seen - tolerable; mild - mod reaction. S/p efudex for the forearms - bad irritation after 11 days so stopped then resume for another week - finished in Dec.   Feels a lot smoother. She has a few small rough spots on the face. Asx.     5. She has a concerning lesion on the R flank x few mos. Asx.     6. She has a subtle hypopigmented area on the upper FH near the hairline. She believes this is where she had something frozen in the past.    She has a family hx of melanoma. Hx of AK vs cheilitis on the upper lip - remains clear; rec aquaphor/HC at past visit. Family history of both BCC (father and sister) and MM (mother). Review of Systems:  Gen: Feels well, good sense of health. Skin: No new or changing moles. No new rashes.     Past Medical History, Family History, Surgical History, Medications and Allergies aldara, tretinoin  - cont sun protection  - can consider repeat PDT for the face (can avoid immediate perioral area but still take valtrex prophylactically) - incubate 1 hour + 15 min  - plan for Calcipotriene + FU - face bid x 4 days  - discussed typical response/goals, sun avoidance with above  - resume tretinoin 0.025% cr qhs to face after finished with trx and healed; educ irrit and photosens  - can add nicotinamide 500 mg po bid    5. R flank - BCC - confirmed with bx  - Shave biopsy performed after verbal consent obtained. Patient educated regarding risk of bleeding, infection, scar and educated on wound care. Skin cleansed with alcohol pad and site anesthetized with lido + epi. Aluminum chloride applied to site for hemostasis. Petrolatum ointment and bandage applied. Specimen bottle labeled with patient information and site and specimen sent to dermpath. - curettage perfomed after bx  - Treatment options discussed with patient including risks scar and recurrence rates. Elected for curettage since small, superficial and non-facial  Curretage today - 9 mm  Ed risk bleed, infxn, scar   Area anesthetized with lido with epi after prep with alcohol pad  Lesion treated with curettage x 3 directions with 2-3 mm margins   Hemostasis with aluminum chloride   Wound bandaged and pt educ re wnd care     6. Upper FH near hairline - hyppopigmented area - ? Scar from LN2 vs BCC - elected to monitor for now   - photo taken  - ed si/sx of NMSC and recheck in 4-6 mos and consider bx  - cont sun protection    F/u in 4-6 mos.

## 2021-10-19 ENCOUNTER — TELEPHONE (OUTPATIENT)
Dept: DERMATOLOGY | Age: 51
End: 2021-10-19

## 2021-11-30 ENCOUNTER — PROCEDURE VISIT (OUTPATIENT)
Dept: DERMATOLOGY | Age: 51
End: 2021-11-30
Payer: COMMERCIAL

## 2021-11-30 VITALS — TEMPERATURE: 98 F

## 2021-11-30 DIAGNOSIS — L57.8 DIFFUSE PHOTODAMAGE OF SKIN: ICD-10-CM

## 2021-11-30 DIAGNOSIS — Z86.018 HISTORY OF DYSPLASTIC NEVUS: ICD-10-CM

## 2021-11-30 DIAGNOSIS — D22.9 MULTIPLE NEVI: ICD-10-CM

## 2021-11-30 DIAGNOSIS — Z85.828 HISTORY OF NONMELANOMA SKIN CANCER: Primary | ICD-10-CM

## 2021-11-30 DIAGNOSIS — D48.5 NEOPLASM OF UNCERTAIN BEHAVIOR OF SKIN: ICD-10-CM

## 2021-11-30 DIAGNOSIS — L81.4 LENTIGINES: ICD-10-CM

## 2021-11-30 DIAGNOSIS — L57.0 AK (ACTINIC KERATOSIS): ICD-10-CM

## 2021-11-30 DIAGNOSIS — I78.1 TELANGIECTASIA: ICD-10-CM

## 2021-11-30 PROCEDURE — 11102 TANGNTL BX SKIN SINGLE LES: CPT | Performed by: DERMATOLOGY

## 2021-11-30 PROCEDURE — 99214 OFFICE O/P EST MOD 30 MIN: CPT | Performed by: DERMATOLOGY

## 2021-11-30 PROCEDURE — 11103 TANGNTL BX SKIN EA SEP/ADDL: CPT | Performed by: DERMATOLOGY

## 2021-11-30 PROCEDURE — MISCHAIR24 LASER FOLLOW UP SPOT: Performed by: DERMATOLOGY

## 2021-11-30 RX ORDER — METOPROLOL SUCCINATE 25 MG/1
25 TABLET, EXTENDED RELEASE ORAL DAILY
COMMUNITY
Start: 2021-03-23

## 2021-11-30 NOTE — PROGRESS NOTES
[address] [facility]  Dereck Aase, MD  [phone]      Nina Reynoso  1970    45 y.o. female     Date of Visit: 11/30/2021    Last seen 3-2021  *dad diagnosed with lung cancer in 2018  *has had a lot of stress over the past year - had to get emergency guardianship over her mom in 3-2021    Chief Complaint: dysplastic nevus, hx of NMSC  Chief Complaint   Patient presents with    Procedure     V-beam,-nasal bridge and Rt forearm    Lesion(s)     TBSE, moles of concern-arms, legs. Hx of NMSC,AK's     History of Present Illness:    1. F/u NMSC. No probs with previous sites. *R flank - BCC - bx and curettage 3-2021  *BCC on the L ala - Mohs with Dr. Renetta Abernathy   *Nod BCC on the R nasal sidewall - Mohs  with Dr. Monae Choe  *Sup BCC on the R nasal bridge - s/p Mohs 1-2020  *BCC - R FA - excision 3-2018. 2. Here for evaluation of multiple asx pigmented lesions on the trunk and extremities, present for many years; no change in size/shape/color of any lesions; no bleeding lesions. 3. Hx of dysplastic nevus/nevi - s/p excision of moderately dysplastic nevus on the R shoulder . No probs since. 4. F/u AK's  S/p PDT for the face  - tolerated well. S/p Efudex for the chest x 4 weeks since last seen - tolerable; mild - mod reaction. S/p efudex for the forearms - bad irritation after 11 days so stopped then resume for another week. Feels a lot smoother. S/p efud + calcipotriene to the face since last seen - tolerated well. She has a few small rough spots on the face. Asx.     5. She has a concerning lesion on the upper back - unaware of it. Few other concerning lesions she notes - L thigh, L arm, L cheek. Asx.     6. She has a subtle hypopigmented area on the upper FH near the hairline. She believes this is where she had something frozen in the past.  Noted at past visit - unchanged. She has a family hx of melanoma.     Hx of AK vs cheilitis on the upper upper outer arm with tan dome-shaped mamillated papule with subtle preipheral hypopig rim  L lateral cheek with pinkish-tan waxy papule    From 3-2021 visit:            Previous sites:                Assessment and Plan     1. Hx of NMSC, no signs recurrence  2. Benign-appearing nevi and lentigines  3. Hx of dysplastic nevus, no signs recurrence     4. AK's - chest/arm areas better, face with more chronic photodamage - not at goal  - discussed sun protection, PDT, efudex, aldara, tretinoin  - cont sun protection  - can consider repeat PDT for the face (can avoid immediate perioral area but still take valtrex prophylactically) - incubate 1 hour + 15 min  - plan for Calcipotriene + FU - face bid x 4 days  - discussed typical response/goals, sun avoidance with above  - resume tretinoin 0.025% cr qhs to face after finished with trx and healed; educ irrit and photosens  - can add nicotinamide 500 mg po bid    5. L upper outer thigh - r/o ruptured follicle vs BCC  R/o dysplastic nevus (likely irritated compound) - L upper outer arm  Upper central back - r/o BCC  L lateral cheek - r/o SK vs BCC  - 4 Shave biopsy performed after verbal consent obtained. Patient educated regarding risk of bleeding, infection, scar and educated on wound care. Skin cleansed with alcohol pad and site anesthetized with lido + epi. Aluminum chloride applied to site for hemostasis. Petrolatum ointment and bandage applied. Specimen bottle labeled with patient information and site and specimen sent to dermpath. 6. Upper FH near hairline - hypopigmented area - ? Scar from LN2 vs BCC - elected to monitor for now   - photo taken  - ed si/sx of NMSC and recheck in 4-6 mos and consider bx  - cont sun protection    F/u in 4-6 mos.     *I'm OK to see her mom as a patient    *Vbeam today as well    Laser Procedure Note       Masood Coreas   YOB: 1970    DATE OF VISIT:  11/30/2021  Chief Complaint   Patient presents with   Theodoro Milder Procedure     V-beam,-nasal bridge and Rt forearm    Lesion(s)     TBSE, moles of concern-arms, legs. Hx of NMSC,AK's     LASER: Vbeam  DIAGNOSIS: telangiectasias, scar    We discussed treatment options, including no treatment as well as the following:  - need for multiple treatments and risk of incomplete clearance, recurrence  - risk of erythema, edema, purpura, dyspigmentation and scarring    PATIENT IDENTIFIED PER PROTOCOL: yes  LOCATION(S): face  VERIFIED AND MARKED: yes  TECHNIQUES, RISKS, BENEFITS AND ALTERNATIVES EXPLAINED: yes  CONSENT SIGNED, WITNESSED AND DATED: yes        OPERATIVE REPORT    DIAGNOSIS, LOCATION, PROCEDURE RECONFIRMED: yes   APPROPRIATE EYE PROTECTION for PATIENT: yes  APPROPRIATE EYE PROTECTION for PROVIDER and OTHERS PRESENT: yes  ANESTHESIA/PRE-OP MEDICATIONS: none  LASER SETTINGS:  (1)  WAVELENGTH: 595  LENS: 3x10  FLUENCE: 12.5  PULSE DURATION: 10  COOLIN/20  (2)  WAVELENGTH: 595  LENS: 10  FLUENCE: 7.5  PULSE DURATION: 10  COOLIN/20    PROCEDURE NOTE:  Individual telang treated with setting 1 and then more diffusely with setting 2.     POST-OPERATIVE CARE/DISPOSITION: ice   COMPLICATIONS: none  MEDICATIONS: none  WOUND CARE INSTRUCTIONS PROVIDED: yes    F/u 75

## 2021-12-06 ENCOUNTER — TELEPHONE (OUTPATIENT)
Dept: DERMATOLOGY | Age: 51
End: 2021-12-06

## 2021-12-06 NOTE — TELEPHONE ENCOUNTER
Braden Manzanares MD   12/2/2021 11:33 PM EST         L thigh - benign nevus - NFT needed  L upper arm - benign nevus - NFT needed  *Upper central back - BCC - rtn for excision (likely) vs curettage  L lateral cheek - inflamed benign keratosis - NFT needed     Spoke with patient and inform her  of biopsy results. Scheduled for 1-27-22 arriving at 3:30 for Excision. (vs curettage)  6 mo TBSE 5-24-22 9:00 am.

## 2021-12-27 RX ORDER — VALACYCLOVIR HYDROCHLORIDE 1 G/1
TABLET, FILM COATED ORAL
Qty: 16 TABLET | Refills: 2 | Status: SHIPPED | OUTPATIENT
Start: 2021-12-27

## 2021-12-30 RX ORDER — FLUOROURACIL 50 MG/G
CREAM TOPICAL
Qty: 40 G | Refills: 5 | Status: SHIPPED | OUTPATIENT
Start: 2021-12-30

## 2022-01-27 ENCOUNTER — PROCEDURE VISIT (OUTPATIENT)
Dept: DERMATOLOGY | Age: 52
End: 2022-01-27
Payer: COMMERCIAL

## 2022-01-27 VITALS — TEMPERATURE: 98.6 F

## 2022-01-27 DIAGNOSIS — C44.519 BASAL CELL CARCINOMA (BCC) OF BACK: Primary | ICD-10-CM

## 2022-01-27 PROCEDURE — 12032 INTMD RPR S/A/T/EXT 2.6-7.5: CPT | Performed by: DERMATOLOGY

## 2022-01-27 PROCEDURE — 11602 EXC TR-EXT MAL+MARG 1.1-2 CM: CPT | Performed by: DERMATOLOGY

## 2022-01-27 NOTE — PATIENT INSTRUCTIONS
Care of Wound Closed with Dermabond    A special skin glue called Dermabond was used to hold your wound together on the surface of the skin. The glue film will loosen from your skin on its own as the wound heals. Follow these care guidelines:    · Keep the wound dry. · Do not pick, scratch or rub the glue on the wound so it does not loosen before the wound heals. · Do not soak your wound in water until the glue film falls off. Avoid swimming or using a hot tub while the glue is in place. · When you shower or bathe, let water run over the wound but do not rub. Pat the wound gently with a soft towel to dry. · Do no apply any cream, lotion or ointment to the skin near the wound. It could loosen the glue before the wound heals. · Do not apply any tape, sticky dressing, or alcohol to the glue site for 10 days. These could also loosen the glue. Call your doctor if you have any of these signs of infection:    · Skin around the wound is more red, swollen or feels hot. · Fluid builds up under the glue    · Wound smells bad    · Pus drainage    · Fever     · Increasing pain    Surgical Wound Care Instructions     After your surgery, go home and take it easy. Please refrain from any vigorous physical activity or heavy lifting for 14 days.  Leave the pressure bandage in place for 48 hours. If it starts to detach, reinforce the bandage with another piece of tape.  Please keep the bandage dry for 48 hours.  After 48 hours, the wound can get wet but do not soak or scrub the area.  Apply a non stick bandage or non stick gauze pad to the site daily with medical tape for a total of 14 days. Complications:   If bleeding develops when you go home, apply pressure for 15 minutes continuously. A small amount of ice in a bag covered with a towel may be used for another 10 minutes if necessary.   If the bleeding does not stop, please call your dermatologist.   Please call the office if you

## 2022-01-27 NOTE — PROGRESS NOTES
[facility] [facility]  Brit Eason MD  [phone]      Preeti Slight  1970    45 y.o. female     Date of Visit: 1/27/2022    Last seen 3-2021  *dad diagnosed with lung cancer in 2018  *has had a lot of stress over the past year - had to get emergency guardianship over her mom in 3-2021    Chief Complaint: g  Chief Complaint   Patient presents with    Basal Cell Carcinoma     upper central back     History of Present Illness:    Here for excision of BCC on the back - bx'd at lst visit. No probs since. No bleeding probs or implanted devices. *R flank - BCC - bx and curettage 3-2021  *BCC on the L ala - Mohs with Dr. Ananth Tompkins   *Nod BCC on the R nasal sidewall - Mohs  with Dr. Nicholas aGrza  *Sup BCC on the R nasal bridge - s/p Mohs 1-2020  *BCC - R FA - excision 3-2018. S/p PDT for the face  - tolerated well. S/p Efudex for the chest x 4 weeks since last seen - tolerable; mild - mod reaction. S/p efudex for the forearms - bad irritation after 11 days so stopped then resume for another week. Feels a lot smoother. S/p efud + calcipotriene to the face since last seen - tolerated well. She has a few small rough spots on the face. Asx. She has a family hx of melanoma. Family history of both BCC (father and sister) and MM (mother). Review of Systems:  Gen: Feels well, good sense of health. Past Medical History, Family History, Surgical History, Medications and Allergies reviewed. Social History:  Has 3 sons - they go to [facility] [employer] [facility] [employer] - [place]. PMR - Knows SOHAM Palomino.     Past Medical History:   Diagnosis Date    Arthritis     TOES GREAT    Cancer ([address]     basal cell cancer       Past Surgical History:   Procedure Laterality Date    CARPAL TUNNEL RELEASE Bilateral 11/2016    MOHS SURGERY  11/07/2019    MOHS SURGERY Right 01/09/2020    BCC superficial right nasal bridge    SKIN CANCER EXCISION      TONSILLECTOMY         Outpatient Medications Marked as Taking for the 1/27/22 encounter (Procedure visit) with Gwen Valadez MD   Medication Sig Dispense Refill    valACYclovir (VALTREX) 1 g tablet TAKE TWO (2) BY MOUTH AT FIRST SIGNS OF FLARE AND THEN TWO (2) BY MOUTH 12 HOURS LATER. 16 tablet 2    metoprolol succinate (TOPROL XL) 25 MG extended release tablet Take 25 mg by mouth daily      tretinoin (RETIN-A) 0.025 % cream Apply a pea sized amount to the face QHS 20 g 3    calcipotriene (DOVONEX) 0.005 % cream Apply to affected area BID for 4 days for AK's. 60 g 1    valACYclovir (VALTREX) 500 MG tablet Take 1 po bid starting day before treatment and continue for ~ 1 week until done peeling. 20 tablet 1    omeprazole (PRILOSEC) 40 MG delayed release capsule Take 1 capsule by mouth daily 30 capsule 1    Multiple Vitamin (MULTIVITAMINS PO) Take  by mouth.          Allergies   Allergen Reactions    No Known Allergies          Physical Examination     Well-appearing    Upper back with shiny faint pink macule/thin papule    From 3-2021 visit:            Previous sites:                Assessment and Plan     BCC - upper back  - excision today after risks reviewed and pt consented  educ risk bleed, infxn, scar   area anesth with lido with epi and prepped in sterile manner   ellipse excised to superficial SQ with 3-4 mm margins - 1.4 cm  specimen to path   edges undermined and hemostasis achieved with electrodessication   wound closed with layered closure 4-0 deep vicryl sutures and 4-0 running nylon suture   pressure dressing applied   pt educ re wnd care and suture removal     Final repair: 4.2 cm    *I'm OK to see her mom as a patient

## 2022-05-24 ENCOUNTER — OFFICE VISIT (OUTPATIENT)
Dept: DERMATOLOGY | Age: 52
End: 2022-05-24
Payer: COMMERCIAL

## 2022-05-24 DIAGNOSIS — D22.9 MULTIPLE NEVI: ICD-10-CM

## 2022-05-24 DIAGNOSIS — L90.5 SCAR: ICD-10-CM

## 2022-05-24 DIAGNOSIS — Z86.018 HISTORY OF DYSPLASTIC NEVUS: ICD-10-CM

## 2022-05-24 DIAGNOSIS — L57.8 DIFFUSE PHOTODAMAGE OF SKIN: ICD-10-CM

## 2022-05-24 DIAGNOSIS — Z85.828 HISTORY OF NONMELANOMA SKIN CANCER: Primary | ICD-10-CM

## 2022-05-24 DIAGNOSIS — L57.0 AK (ACTINIC KERATOSIS): ICD-10-CM

## 2022-05-24 DIAGNOSIS — L81.4 LENTIGINES: ICD-10-CM

## 2022-05-24 DIAGNOSIS — D48.5 NEOPLASM OF UNCERTAIN BEHAVIOR OF SKIN: ICD-10-CM

## 2022-05-24 PROCEDURE — 99213 OFFICE O/P EST LOW 20 MIN: CPT | Performed by: DERMATOLOGY

## 2022-05-24 PROCEDURE — 11102 TANGNTL BX SKIN SINGLE LES: CPT | Performed by: DERMATOLOGY

## 2022-05-24 NOTE — PROGRESS NOTES
[address] [facility]ology  Lola Meraz MD  [phone]      Uriel Heredia  1970    45 y.o. female     Date of Visit: 5/24/2022    Last seen 1-2022  *dad diagnosed with lung cancer in 2018  *has had a lot of stress over the past year - had to get emergency guardianship over her mom in 3-2021    Chief Complaint: dysplastic nevus, hx of NMSC  Chief Complaint   Patient presents with    Skin Lesion     History of Present Illness:    1. F/u NMSC. No probs with previous sites. *BCC - upper back - excision 1-2022  *R flank - BCC - bx and curettage 3-2021  *BCC on the L ala - Mohs with Dr. Lilian Lin   *Nod BCC on the R nasal sidewall - Mohs  with Dr. Christina Davis  *Sup BCC on the R nasal bridge - s/p Mohs 1-2020  *BCC - R FA - excision 3-2018. 2. Here for evaluation of multiple asx pigmented lesions on the trunk and extremities, present for many years; no change in size/shape/color of any lesions; no bleeding lesions. 3. Hx of dysplastic nevus/nevi - s/p excision of moderately dysplastic nevus on the R shoulder . No probs since. 4. F/u AK's  S/p PDT for the face  - tolerated well. S/p Efudex for the chest x 4 weeks in 2021 - tolerable; mild - mod reaction. S/p efudex for the forearms - bad irritation after 11 days so stopped then resume for another week. Feels a lot smoother. s/p Calcipotriene + 5-FU - face bid x 4 days since last seen - smoother - tolerated well. No new concerns    5. She has a concerning lesion on the L side of the nose - anterior to previous Mohs site for [facility]. Asx.     6. She has a subtle hypopigmented area on the upper FH near the hairline. She believes this is where she had something frozen in the past.  Noted at past visit - unchanged. She has a family hx of melanoma. Hx of AK vs cheilitis on the upper lip - remains clear; rec aquaphor/HC at past visit. Family history of both BCC (father and sister) and MM (mother).     Review of Systems:  Gen: Feels well, good sense of health. Skin: No new or changing moles. No new rashes. Past Medical History, Family History, Surgical History, Medications and Allergies reviewed. Social History:  Has 3 sons - they go to [employer]. Leesa's. PMR - Knows SOHAM Antoine and Graham. Past Medical History:   Diagnosis Date    Arthritis     TOES GREAT    Cancer ([address]     basal cell cancer       Past Surgical History:   Procedure Laterality Date    CARPAL TUNNEL RELEASE Bilateral 11/2016    MOHS SURGERY  11/07/2019    MOHS SURGERY Right 01/09/2020    BCC superficial right nasal bridge    SKIN CANCER EXCISION      TONSILLECTOMY         Outpatient Medications Marked as Taking for the 5/24/22 encounter (Office Visit) with Enrrique Lee MD   Medication Sig Dispense Refill    fluorouracil (EFUDEX) 5 % cream APPLY TWICE DAILY FOR TWO (2) TO FOUR (4) WEEKS OR UNTIL ENOUGH IRRITATION AVOID EYES 40 g 5    valACYclovir (VALTREX) 1 g tablet TAKE TWO (2) BY MOUTH AT FIRST SIGNS OF FLARE AND THEN TWO (2) BY MOUTH 12 HOURS LATER. 16 tablet 2    metoprolol succinate (TOPROL XL) 25 MG extended release tablet Take 25 mg by mouth daily      tretinoin (RETIN-A) 0.025 % cream Apply a pea sized amount to the face QHS 20 g 3    calcipotriene (DOVONEX) 0.005 % cream Apply to affected area BID for 4 days for AK's. 60 g 1    valACYclovir (VALTREX) 500 MG tablet Take 1 po bid starting day before treatment and continue for ~ 1 week until done peeling. 20 tablet 1    omeprazole (PRILOSEC) 40 MG delayed release capsule Take 1 capsule by mouth daily 30 capsule 1    Multiple Vitamin (MULTIVITAMINS PO) Take  by mouth.          Allergies   Allergen Reactions    No Known Allergies          Physical Examination     Well-appearing    FSE done except for underwear-covered areas    Scars clear  R outer shoulder -pink smooth scar  L anterior ala - clear  trunk and extremities with scattered brown macules and papules   Arms and chest fairly smooth - few small dry macules  No facial AK's  L lower nasal sidewall (anterior to ala and previous Mohs site) - waxy sclerotic ~ 3 mm papule    5-2022          From 3-2021 visit:            Previous sites:                Assessment and Plan     1. Hx of NMSC, no signs recurrence  2. Benign-appearing nevi and lentigines  3. Hx of dysplastic nevus, no signs recurrence     4. AK's - chest/arm areas better, face with more chronic photodamage - improved  - discussed sun protection, PDT, efudex, aldara, tretinoin  - cont sun protection  - can consider repeat PDT for the face (can avoid immediate perioral area but still take valtrex prophylactically) - incubate 1 hour + 15 min  - or consider rpt Calcipotriene + FU - face in the fall if needed  - discussed typical response/goals, sun avoidance with above  - can use tretinoin 0.025% cr qhs to face; educ irrit and photosens  - previously discussed - can add nicotinamide 500 mg po bid    5. L lower nasal sidewall (anterior to ala and previous Mohs) - r/o BCC  - Shave biopsy performed after verbal consent obtained. Patient educated regarding risk of bleeding, infection, scar and educated on wound care. Skin cleansed with alcohol pad and site anesthetized with lido + epi. Aluminum chloride applied to site for hemostasis. Petrolatum ointment and bandage applied. Specimen bottle labeled with patient information and site and specimen sent to dermpath. 6. Upper FH near hairline - hypopigmented area - ? Scar from LN2 vs BCC - elected to monitor for now - stable  - photo taken  - ed si/sx of NMSC and recheck in 4-6 mos and consider bx  - cont sun protection    F/u in 4-6 mos.       *Vbeam today as well

## 2022-05-26 LAB — DERMATOLOGY PATHOLOGY REPORT: NORMAL

## 2022-10-24 LAB — HCG URINE: NEGATIVE

## 2022-11-29 ENCOUNTER — OFFICE VISIT (OUTPATIENT)
Dept: DERMATOLOGY | Age: 52
End: 2022-11-29
Payer: COMMERCIAL

## 2022-11-29 DIAGNOSIS — B07.9 VERRUCA VULGARIS: ICD-10-CM

## 2022-11-29 DIAGNOSIS — D48.5 NEOPLASM OF UNCERTAIN BEHAVIOR OF SKIN: ICD-10-CM

## 2022-11-29 DIAGNOSIS — Z86.018 HISTORY OF DYSPLASTIC NEVUS: ICD-10-CM

## 2022-11-29 DIAGNOSIS — Z85.828 HISTORY OF NONMELANOMA SKIN CANCER: Primary | ICD-10-CM

## 2022-11-29 DIAGNOSIS — L57.8 DIFFUSE PHOTODAMAGE OF SKIN: ICD-10-CM

## 2022-11-29 DIAGNOSIS — L57.0 AK (ACTINIC KERATOSIS): ICD-10-CM

## 2022-11-29 DIAGNOSIS — D22.9 MULTIPLE NEVI: ICD-10-CM

## 2022-11-29 DIAGNOSIS — L81.4 LENTIGINES: ICD-10-CM

## 2022-11-29 PROCEDURE — 11103 TANGNTL BX SKIN EA SEP/ADDL: CPT | Performed by: DERMATOLOGY

## 2022-11-29 PROCEDURE — 17110 DESTRUCTION B9 LES UP TO 14: CPT | Performed by: DERMATOLOGY

## 2022-11-29 PROCEDURE — 11102 TANGNTL BX SKIN SINGLE LES: CPT | Performed by: DERMATOLOGY

## 2022-11-29 PROCEDURE — 99213 OFFICE O/P EST LOW 20 MIN: CPT | Performed by: DERMATOLOGY

## 2022-11-29 RX ORDER — VALACYCLOVIR HYDROCHLORIDE 1 G/1
TABLET, FILM COATED ORAL
Qty: 16 TABLET | Refills: 2 | Status: SHIPPED | OUTPATIENT
Start: 2022-11-29

## 2022-11-29 NOTE — PROGRESS NOTES
[address] Dermatology  Sofia Burdick MD  [phone]      Rin Hale  1970    45 y.o. female     Date of Visit: 11/29/2022    Last seen 5-2022  *dad diagnosed with lung cancer in 2018  *has had a lot of stress over the past year - had to get emergency guardianship over her mom in 3-2021    Chief Complaint: dysplastic nevus, hx of NMSC  Chief Complaint   Patient presents with    Skin Exam     6 mo FSE       HX:NMSC     History of Present Illness:    1. F/u NMSC. No probs with previous sites. *BCC - upper back - excision 1-2022  *R flank - BCC - bx and curettage 3-2021  *BCC on the L ala - Mohs with Dr. Barbara Lambert   *Nod BCC on the R nasal sidewall - Mohs  with Dr. Tod Leonard  *Sup BCC on the R nasal bridge - s/p Mohs 1-2020  *BCC - R FA - excision 3-2018. 2. Here for evaluation of multiple asx pigmented lesions on the trunk and extremities, present for many years; no change in size/shape/color of any lesions; no bleeding lesions. 3. Hx of dysplastic nevus/nevi - s/p excision of moderately dysplastic nevus on the R shoulder . No probs since. 4. F/u AK's  S/p PDT for the face  - tolerated well. S/p Efudex for the chest x 4 weeks in 2021 - tolerable; mild - mod reaction. S/p efudex for the forearms - bad irritation after 11 days so stopped then resume for another week. Feels a lot smoother. s/p Calcipotriene + 5-FU - face bid x 4 days since last seen - smoother - tolerated well. No new concerns    5. L lower nasal sidewall (anterior to ala and previous Mohs) - AF - bx 5-2022    6. She has 2 concerning lesions:  L upper outer shin - r/o BCC  R temporal hairline - r/o BCC vs ruptured follicle    7. She has a wart on the R index. She has a family hx of melanoma. Hx of AK vs cheilitis on the upper lip - remains clear; rec aquaphor/HC at past visit. Family history of both BCC (father and sister) and MM (mother).     Review of Systems:  Gen: Feels well, good sense of health. Skin: No new or changing moles. No new rashes. Past Medical History, Family History, Surgical History, Medications and Allergies reviewed. Social History:  Has 3 sons - they go to [employer] [facility] [employer] [facility] [place]. Seven. PMR - Durga Antoine and [employer]. Past Medical History:   Diagnosis Date    Arthritis     TOES GREAT    Cancer ([address]     basal cell cancer       Past Surgical History:   Procedure Laterality Date    CARPAL TUNNEL RELEASE Bilateral 11/2016    MOHS SURGERY  11/07/2019    MOHS SURGERY Right 01/09/2020    BCC superficial right nasal bridge    SKIN CANCER EXCISION      TONSILLECTOMY         Outpatient Medications Marked as Taking for the 11/29/22 encounter (Office Visit) with Heath Cogan, MD   Medication Sig Dispense Refill    fluorouracil (EFUDEX) 5 % cream APPLY TWICE DAILY FOR TWO (2) TO FOUR (4) WEEKS OR UNTIL ENOUGH IRRITATION AVOID EYES 40 g 5    valACYclovir (VALTREX) 1 g tablet TAKE TWO (2) BY MOUTH AT FIRST SIGNS OF FLARE AND THEN TWO (2) BY MOUTH 12 HOURS LATER. 16 tablet 2    metoprolol succinate (TOPROL XL) 25 MG extended release tablet Take 25 mg by mouth daily      tretinoin (RETIN-A) 0.025 % cream Apply a pea sized amount to the face QHS 20 g 3    calcipotriene (DOVONEX) 0.005 % cream Apply to affected area BID for 4 days for AK's. 60 g 1    valACYclovir (VALTREX) 500 MG tablet Take 1 po bid starting day before treatment and continue for ~ 1 week until done peeling. 20 tablet 1    omeprazole (PRILOSEC) 40 MG delayed release capsule Take 1 capsule by mouth daily 30 capsule 1    Multiple Vitamin (MULTIVITAMINS PO) Take  by mouth.          Allergies   Allergen Reactions    No Known Allergies          Physical Examination     Well-appearing    FSE done except for underwear-covered areas    Scars clear  R outer shoulder -pink smooth scar  L anterior ala - clear  trunk and extremities with scattered brown macules and papules   Arms and chest fairly smooth - few small dry macules  No facial AK's  L upper outer shin with pink faint 3 mm papule  R temporal hairline with pink scaled thin 2-3 mm ppaule  R index with 1-2 mm hyperkeratotic papule    5-2022 - bx angiofibroma          From 3-2021 visit:            Previous sites:                Assessment and Plan     1. Hx of NMSC, no signs recurrence  2. Benign-appearing nevi and lentigines  3. Hx of dysplastic nevus, no signs recurrence  - Monitor for ABCD's of MM and si/sx of NMSC  Continue sun protection - OTC sunscreen with SPF 30-50+ recommended and reviewed usage  Encouraged skin check yearly (sooner if indicated), self checks     4. AK's - chest/arm areas better, face with more chronic photodamage - improved  - discussed sun protection, PDT, efudex, aldara, tretinoin  - cont sun protection    - can consider repeat PDT for the face (can avoid immediate perioral area but still take valtrex prophylactically) - incubate 1 hour + 15 min  - or consider rpt Calcipotriene + FU - face if needed but can wait until next fall 2023 for this  - discussed typical response/goals, sun avoidance with above    - can go ahead and use tretinoin 0.025% cr qhs to face; educ irrit and photosens  - previously discussed - can add nicotinamide 500 mg po bid    5. L lower nasal sidewall (anterior to ala and previous Mohs) - AF - bx 5-2022     6. L upper outer shin - r/o BCC  R temporal hairline - r/o BCC vs ruptured follicle  - 2 Shave biopsy performed after verbal consent obtained. Patient educated regarding risk of bleeding, infection, scar and educated on wound care. Skin cleansed with alcohol pad and site anesthetized with lido + epi. Aluminum chloride applied to site for hemostasis. Petrolatum ointment and bandage applied. Specimen bottle labeled with patient information and site and specimen sent to dermpath. 7. Wart - R index - 1  - - 1 lesion(s) treated with liquid nitrogen with cryac or swab.   Treated with 2 cycles for 1-5 seconds each after consent from patient. Patient educated on risk of blister, hypopigmentation/scar and wound care. Tolerated well. F/u in 4-6 mos.

## 2022-11-29 NOTE — PATIENT INSTRUCTIONS

## 2022-12-01 LAB — DERMATOLOGY PATHOLOGY REPORT: NORMAL

## 2023-05-23 ENCOUNTER — OFFICE VISIT (OUTPATIENT)
Dept: DERMATOLOGY | Age: 53
End: 2023-05-23

## 2023-05-23 DIAGNOSIS — L81.4 LENTIGINES: ICD-10-CM

## 2023-05-23 DIAGNOSIS — L57.0 AK (ACTINIC KERATOSIS): ICD-10-CM

## 2023-05-23 DIAGNOSIS — Z86.018 HISTORY OF DYSPLASTIC NEVUS: ICD-10-CM

## 2023-05-23 DIAGNOSIS — D22.9 MULTIPLE NEVI: ICD-10-CM

## 2023-05-23 DIAGNOSIS — D48.5 NEOPLASM OF UNCERTAIN BEHAVIOR OF SKIN: ICD-10-CM

## 2023-05-23 DIAGNOSIS — Z85.828 HISTORY OF NONMELANOMA SKIN CANCER: Primary | ICD-10-CM

## 2023-05-23 DIAGNOSIS — L57.8 DIFFUSE PHOTODAMAGE OF SKIN: ICD-10-CM

## 2023-05-23 RX ORDER — MINOCYCLINE HYDROCHLORIDE 100 MG/1
CAPSULE ORAL
Qty: 20 CAPSULE | Refills: 0 | Status: SHIPPED | OUTPATIENT
Start: 2023-05-23

## 2023-05-23 NOTE — PATIENT INSTRUCTIONS

## 2023-05-23 NOTE — PROGRESS NOTES
[address] Dermatology  Maksim Ovalles MD  [phone]      Walter Carrasco  1970    47 y.o. female     Date of Visit: 5/23/2023    Last seen   *dad diagnosed with lung cancer in 2018  *has had a lot of stress over the past few years - had to get emergency guardianship for mom in 3-2021    Oldest graudating 2023 - going to [employer] [facility] [place]    Chief Complaint: dysplastic nevus, hx of NMSC  Chief Complaint   Patient presents with    Skin Exam     History of Present Illness:    1. F/u NMSC. No probs with previous sites. *BCC - upper back - excision 1-2022  *R flank - BCC - bx and curettage 3-2021  *BCC on the L ala - Mohs with Dr. Blossom Mehta   *Nod BCC on the R nasal sidewall - Mohs  with Dr. Claudean Hoose  *Sup BCC on the R nasal bridge - s/p Mohs 1-2020  *BCC - R FA - excision 3-2018. 2. Here for evaluation of multiple asx pigmented lesions on the trunk and extremities, present for many years; no change in size/shape/color of any lesions; no bleeding lesions. 3. Hx of dysplastic nevus/nevi - s/p excision of moderately dysplastic nevus on the R shoulder . No probs since. 4. F/u AK's   S/p PDT for the face  - tolerated well. S/p Efudex for the chest x 4 weeks in 2021 - tolerable; mild - mod reaction. S/p efudex for the forearms - bad irritation after 11 days so stopped then resume for another week. Feels a lot smoother. s/p Calcipotriene + 5-FU - face bid x 4 days since last seen - smoother - tolerated well. One new rough spot on the FH - asx.     5. One concerning lesio non the upper back. Asx. She has a family hx of melanoma. Hx of AK vs cheilitis on the upper lip - remains clear; rec aquaphor/HC at past visit. Family history of both BCC (father and sister) and MM (mother). Review of Systems:  Gen: Feels well, good sense of health. Skin: No new or changing moles. No new rashes.     Past Medical History, Family History, Surgical History,

## 2023-05-30 LAB — DERMATOLOGY PATHOLOGY REPORT: NORMAL

## 2024-02-27 ENCOUNTER — OFFICE VISIT (OUTPATIENT)
Dept: DERMATOLOGY | Age: 54
End: 2024-02-27
Payer: COMMERCIAL

## 2024-02-27 DIAGNOSIS — L57.0 AK (ACTINIC KERATOSIS): ICD-10-CM

## 2024-02-27 DIAGNOSIS — L81.4 LENTIGINES: ICD-10-CM

## 2024-02-27 DIAGNOSIS — D22.9 MULTIPLE NEVI: ICD-10-CM

## 2024-02-27 DIAGNOSIS — L57.8 DIFFUSE PHOTODAMAGE OF SKIN: ICD-10-CM

## 2024-02-27 DIAGNOSIS — Z85.828 HISTORY OF NONMELANOMA SKIN CANCER: Primary | ICD-10-CM

## 2024-02-27 DIAGNOSIS — D48.5 NEOPLASM OF UNCERTAIN BEHAVIOR OF SKIN: ICD-10-CM

## 2024-02-27 DIAGNOSIS — Z86.018 HISTORY OF DYSPLASTIC NEVUS: ICD-10-CM

## 2024-02-27 PROCEDURE — 17000 DESTRUCT PREMALG LESION: CPT | Performed by: DERMATOLOGY

## 2024-02-27 PROCEDURE — 99214 OFFICE O/P EST MOD 30 MIN: CPT | Performed by: DERMATOLOGY

## 2024-02-27 PROCEDURE — 11102 TANGNTL BX SKIN SINGLE LES: CPT | Performed by: DERMATOLOGY

## 2024-02-27 RX ORDER — VALACYCLOVIR HYDROCHLORIDE 1 G/1
TABLET, FILM COATED ORAL
Qty: 16 TABLET | Refills: 2 | Status: SHIPPED | OUTPATIENT
Start: 2024-02-27

## 2024-02-27 NOTE — PATIENT INSTRUCTIONS

## 2024-02-27 NOTE — PROGRESS NOTES
or swab.  Treated with 2 cycles for 1-5 seconds each after consent from patient.   Patient educated on risk of blister, hypopigmentation/scar and wound care.  Tolerated well.   - discussed sun protection, PDT, efudex, aldara, tretinoin  - cont sun protection    - used Calcipotriene + FU - FA's x 5 days and face erupted with erythema and edema with only 1 day of treatment so stopped;  repeat prn.  - discussed typical response/goals, sun avoidance with above    - can go ahead and use tretinoin 0.025% cr qhs to face; educ irrit and photosens  - previously discussed - can add nicotinamide 500 mg po bid    5. R FH - r/o SK vs BCC  - Shave biopsy performed after verbal consent obtained. Patient educated regarding risk of bleeding, infection, scar and educated on wound care.   Skin cleansed with alcohol pad and site anesthetized with lido + epi.    Aluminum chloride applied to site for hemostasis.  Petrolatum ointment and bandage applied.  Specimen bottle labeled with patient information and site and specimen sent to dermpath.         L lower nasal sidewall (anterior to ala and previous Mohs) - AF - bx 5-2022  L upper outer shin -elastosis, telangiec and dermal fibrosis on biopsy  - picking at this area but otw benign appearing  R temporal hairline - ISK on bx     F/u in 4-6 mos.

## 2024-03-01 LAB
CLINICAL INFORMATION: NORMAL
CPT CODE: NORMAL
CPT DISCLAIMER: NORMAL
DEMOGRAPHIC HEADER: NORMAL
DIAGNOSIS CATEGORY: NORMAL
DIAGNOSIS: NORMAL
Lab: NORMAL
MICROSCOPIC EXAMINATION: NORMAL
PATHOLOGIST: NORMAL
PERFORMING LOCATION: NORMAL
PLACE OF SERVICE: NORMAL

## 2024-09-10 ENCOUNTER — OFFICE VISIT (OUTPATIENT)
Dept: DERMATOLOGY | Age: 54
End: 2024-09-10
Payer: COMMERCIAL

## 2024-09-10 DIAGNOSIS — L57.8 DIFFUSE PHOTODAMAGE OF SKIN: ICD-10-CM

## 2024-09-10 DIAGNOSIS — D22.9 MULTIPLE NEVI: ICD-10-CM

## 2024-09-10 DIAGNOSIS — Z86.018 HISTORY OF DYSPLASTIC NEVUS: ICD-10-CM

## 2024-09-10 DIAGNOSIS — Z85.828 HISTORY OF NONMELANOMA SKIN CANCER: Primary | ICD-10-CM

## 2024-09-10 DIAGNOSIS — L81.4 LENTIGINES: ICD-10-CM

## 2024-09-10 DIAGNOSIS — L57.0 AK (ACTINIC KERATOSIS): ICD-10-CM

## 2024-09-10 PROCEDURE — 99213 OFFICE O/P EST LOW 20 MIN: CPT | Performed by: DERMATOLOGY

## 2024-09-10 RX ORDER — VALACYCLOVIR HYDROCHLORIDE 1 G/1
TABLET, FILM COATED ORAL
Qty: 16 TABLET | Refills: 2 | Status: SHIPPED | OUTPATIENT
Start: 2024-09-10

## 2025-03-11 ENCOUNTER — OFFICE VISIT (OUTPATIENT)
Age: 55
End: 2025-03-11
Payer: COMMERCIAL

## 2025-03-11 DIAGNOSIS — L57.8 DIFFUSE PHOTODAMAGE OF SKIN: ICD-10-CM

## 2025-03-11 DIAGNOSIS — Z86.018 HISTORY OF DYSPLASTIC NEVUS: ICD-10-CM

## 2025-03-11 DIAGNOSIS — L81.4 LENTIGINES: ICD-10-CM

## 2025-03-11 DIAGNOSIS — Z85.828 HISTORY OF NONMELANOMA SKIN CANCER: Primary | ICD-10-CM

## 2025-03-11 DIAGNOSIS — L57.0 AK (ACTINIC KERATOSIS): ICD-10-CM

## 2025-03-11 DIAGNOSIS — B00.1 HERPES LABIALIS: ICD-10-CM

## 2025-03-11 DIAGNOSIS — D22.9 MULTIPLE NEVI: ICD-10-CM

## 2025-03-11 PROCEDURE — 17000 DESTRUCT PREMALG LESION: CPT | Performed by: DERMATOLOGY

## 2025-03-11 PROCEDURE — 99214 OFFICE O/P EST MOD 30 MIN: CPT | Performed by: DERMATOLOGY

## 2025-03-11 RX ORDER — VALACYCLOVIR HYDROCHLORIDE 1 G/1
TABLET, FILM COATED ORAL
Qty: 16 TABLET | Refills: 2 | Status: SHIPPED | OUTPATIENT
Start: 2025-03-11

## 2025-03-11 NOTE — PROGRESS NOTES
[facility]  Saskia Galarza MD  [phone]      Dayan Madrid  1970    54 y.o. female     Date of Visit: 3/11/2025    Last seen 9-2024  *friend of SOHAM/Graham Antoine  *dad diagnosed with lung cancer in 2018  *has had a lot of stress over the past few years - had to get emergency guardianship for mom in 3-2021    Oldest graduated 2023 - going to [employer]  Next son (runner at [employer]) graduating 2025 - likely [employer] or [employer]  Youngest is sophomore    Chief Complaint: dysplastic nevus, hx of NMSC  Chief Complaint   Patient presents with    Skin Exam     History of Present Illness:    History of Present Illness  The patient is a 54-year-old female presenting for follow-up regarding nonmelanoma skin cancer, evaluation of nevi and lentigines, and a history of dysplastic nevus.    1. F/u NMSC.  No probs with previous sites.  *BCC - upper back - excision 1-2022  *R flank - BCC - bx and curettage 3-2021  *BCC on the L ala - Mohs with Dr. Falcon   *Nod BCC on the R nasal sidewall - Mohs  with Dr. Falcon  *Sup BCC on the R nasal bridge - s/p Mohs 1-2020  *BCC - R FA - excision 3-2018.      2. Here for evaluation of multiple asx pigmented lesions on the trunk and extremities, present for many years; no change in size/shape/color of any lesions; no bleeding lesions.    3. Hx of dysplastic nevus/nevi - s/p excision of moderately dysplastic nevus on the R shoulder .  No probs since.    4. F/u AK's - 1 new concern - L upper lip/infranasal.  S/p PDT for the face  - tolerated well.  S/p Efudex for the chest x 4 weeks in 2021 - tolerable; mild - mod reaction.  S/p efudex for the forearms - bad irritation after 11 days so stopped then resume for another week. Feels a lot smoother.   *used Calcipotriene + FU - FA's x 5 days and face erupted with erythema and edema with only 1 day of treatment so stopped.  Completed early 2024.      She has a family hx of melanoma.    Hx of AK vs